# Patient Record
Sex: FEMALE | Race: OTHER
[De-identification: names, ages, dates, MRNs, and addresses within clinical notes are randomized per-mention and may not be internally consistent; named-entity substitution may affect disease eponyms.]

---

## 2018-06-11 ENCOUNTER — HOSPITAL ENCOUNTER (EMERGENCY)
Dept: HOSPITAL 56 - MW.ED | Age: 43
Discharge: HOME | End: 2018-06-11
Payer: SELF-PAY

## 2018-06-11 DIAGNOSIS — Z79.899: ICD-10-CM

## 2018-06-11 DIAGNOSIS — I10: ICD-10-CM

## 2018-06-11 DIAGNOSIS — N83.202: Primary | ICD-10-CM

## 2018-06-11 LAB
CHLORIDE SERPL-SCNC: 105 MMOL/L (ref 98–107)
SODIUM SERPL-SCNC: 142 MMOL/L (ref 136–145)

## 2018-06-11 PROCEDURE — 96361 HYDRATE IV INFUSION ADD-ON: CPT

## 2018-06-11 PROCEDURE — 96375 TX/PRO/DX INJ NEW DRUG ADDON: CPT

## 2018-06-11 PROCEDURE — 96374 THER/PROPH/DIAG INJ IV PUSH: CPT

## 2018-06-11 PROCEDURE — 85025 COMPLETE CBC W/AUTO DIFF WBC: CPT

## 2018-06-11 PROCEDURE — 83690 ASSAY OF LIPASE: CPT

## 2018-06-11 PROCEDURE — 99284 EMERGENCY DEPT VISIT MOD MDM: CPT

## 2018-06-11 PROCEDURE — 87086 URINE CULTURE/COLONY COUNT: CPT

## 2018-06-11 PROCEDURE — 36415 COLL VENOUS BLD VENIPUNCTURE: CPT

## 2018-06-11 PROCEDURE — 74178 CT ABD&PLV WO CNTR FLWD CNTR: CPT

## 2018-06-11 PROCEDURE — 87040 BLOOD CULTURE FOR BACTERIA: CPT

## 2018-06-11 PROCEDURE — 81001 URINALYSIS AUTO W/SCOPE: CPT

## 2018-06-11 PROCEDURE — 80053 COMPREHEN METABOLIC PANEL: CPT

## 2018-06-11 PROCEDURE — 81025 URINE PREGNANCY TEST: CPT

## 2018-06-11 NOTE — EDM.PDOC
ED HPI GENERAL MEDICAL PROBLEM





- General


Chief Complaint: Flank Pain


Stated Complaint: LEFT SIDE ABDOMINAL PAIN


Time Seen by Provider: 06/11/18 19:18


Source of Information: Reports: Patient





- History of Present Illness


INITIAL COMMENTS - FREE TEXT/NARRATIVE: 





HISTORY AND PHYSICAL:


History of present illness:


[Patient presents with left-sided flank pain for 2 days increasing in severity 

she rates pain 5 out of 10 nonradiating no fever nausea vomiting chills sweats 

she does relate that she does have a history of renal stones





No chest pain shortness breath headache dizziness palpitation no bowel or urine 

symptoms]


Review of systems: 


As per history of present illness and below otherwise all systems reviewed and 

negative.


Past medical history: 


As per history of present illness and as reviewed below otherwise 

noncontributory.


Surgical history: 


As per history of present illness and as reviewed below otherwise 

noncontributory.


Social history: 


No reported history of drug or alcohol abuse.


Family history: 


As per history of present illness and as reviewed below otherwise 

noncontributory.


Physical exam:


HEENT: Atraumatic, normocephalic, pupils reactive, negative for conjunctival 

pallor or scleral icterus, mucous membranes moist, throat clear, neck supple, 

nontender, trachea midline.


Lungs: Clear to auscultation, breath sounds equal bilaterally, chest nontender.


Heart: S1S2, regular, negative for clicks, rubs, or JVD.


Abdomen: Soft, nondistended, mild nonfocal tenderness on the left upper and 

lower quadrant. Negative for masses or hepatosplenomegaly. Negative for 

costovertebral tenderness.


Pelvis: Stable nontender.


Genitourinary: Deferred.


Rectal: Deferred.


Extremities: Atraumatic, negative for cords or calf pain. Neurovascular 

unremarkable.


Neuro: Awake, alert, oriented. Cranial nerves II through XII unremarkable. 

Cerebellum unremarkable. Motor and sensory unremarkable throughout. Exam 

nonfocal.


Diagnostics:


[CBC CMP UA lipase


CT abdomen pelvis with and without contrast


]


Therapeutics:


[Liter normal saline bolus


Toradol 30 mg IV


]


Impression: 


ovarian cyst 3.5 cm on the left


definitive disposition and diagnosis as appropriate pending reevaluation and 

review of above.


  ** Left Flank


Pain Score (Numeric/FACES): 6





- Related Data


 Allergies











Allergy/AdvReac Type Severity Reaction Status Date / Time


 


No Known Allergies Allergy   Verified 06/11/18 18:46











Home Meds: 


 Home Meds





ClonazePAM [KlonoPIN] 0.5 mg PO BID PRN 05/20/16 [History]


Lisinopril 25 mg PO DAILY 05/20/16 [History]











Past Medical History


Cardiovascular History: Reports: Hypertension


Genitourinary History: Reports: Renal Calculus, UTI, Recurrent


Other OB/BYN History: tubal


Psychiatric History: Reports: Anxiety, Depression





Social & Family History





- Family History


Family Medical History: Noncontributory





- Tobacco Use


Smoking Status *Q: Never Smoker





- Recreational Drug Use


Recreational Drug Use: No





ED ROS GENERAL





- Review of Systems


Review Of Systems: See Below





ED EXAM, GENERAL





- Physical Exam


Exam: See Below





Course





- Vital Signs


Last Recorded V/S: 


 Last Vital Signs











Temp  97.5 F   06/11/18 18:44


 


Pulse  94   06/11/18 18:44


 


Resp  18   06/11/18 18:44


 


BP  113/69   06/11/18 18:44


 


Pulse Ox  95   06/11/18 18:44














- Orders/Labs/Meds


Orders: 


 Active Orders 24 hr











 Category Date Time Status


 


 Abdomen Pelvis w wo Cont [CT] Stat Exams  06/11/18 19:19 Taken


 


 CULTURE BLOOD [BC] Stat Lab  06/11/18 19:30 Received


 


 CULTURE BLOOD [BC] Stat Lab  06/11/18 19:49 Received


 


 CULTURE URINE [RM] Stat Lab  06/11/18 19:21 Ordered


 


 HCG QUALITATIVE,URINE [URCHEM] Stat Lab  06/11/18 19:21 Ordered


 


 UA W/MICROSCOPIC [URIN] Stat Lab  06/11/18 19:21 Ordered


 


 Blood Culture x2 Reflex Set [OM.PC] Stat Oth  06/11/18 19:18 Ordered











Labs: 


 Laboratory Tests











  06/11/18 06/11/18 06/11/18 Range/Units





  19:21 19:21 19:42 


 


WBC    10.01  (4.0-11.0)  K/uL


 


RBC    4.54  (4.30-5.90)  M/uL


 


Hgb    14.0  (12.0-16.0)  g/dL


 


Hct    41.2  (36.0-46.0)  %


 


MCV    90.7  (80.0-98.0)  fL


 


MCH    30.8  (27.0-32.0)  pg


 


MCHC    34.0  (31.0-37.0)  g/dL


 


RDW Std Deviation    43.6  (28.0-62.0)  fl


 


RDW Coeff of Brissa    13  (11.0-15.0)  %


 


Plt Count    409 H  (150-400)  K/uL


 


MPV    9.70  (7.40-12.00)  fL


 


Neut % (Auto)    70.1  (48.0-80.0)  %


 


Lymph % (Auto)    16.5  (16.0-40.0)  %


 


Mono % (Auto)    12.6  (0.0-15.0)  %


 


Eos % (Auto)    0.6  (0.0-7.0)  %


 


Baso % (Auto)    0.2  (0.0-1.5)  %


 


Neut # (Auto)    7.0 H  (1.4-5.7)  K/uL


 


Lymph # (Auto)    1.7  (0.6-2.4)  K/uL


 


Mono # (Auto)    1.3 H  (0.0-0.8)  K/uL


 


Eos # (Auto)    0.1  (0.0-0.7)  K/uL


 


Baso # (Auto)    0.0  (0.0-0.1)  K/uL


 


Nucleated RBC %    0.0  /100WBC


 


Nucleated RBCs #    0  K/uL


 


Sodium     (136-145)  mmol/L


 


Potassium     (3.5-5.1)  mmol/L


 


Chloride     ()  mmol/L


 


Carbon Dioxide     (21.0-32.0)  mmol/L


 


BUN     (7.0-18.0)  mg/dL


 


Creatinine     (0.6-1.0)  mg/dL


 


Est Cr Clr Drug Dosing     mL/min


 


Estimated GFR (MDRD)     ml/min


 


Glucose     ()  mg/dL


 


Calcium     (8.5-10.1)  mg/dL


 


Total Bilirubin     (0.2-1.0)  mg/dL


 


AST     (15-37)  IU/L


 


ALT     (14-63)  IU/L


 


Alkaline Phosphatase     ()  U/L


 


Total Protein     (6.4-8.2)  g/dL


 


Albumin     (3.4-5.0)  g/dL


 


Globulin     (2.0-3.5)  g/dL


 


Albumin/Globulin Ratio     (1.3-2.8)  


 


Lipase     ()  U/L


 


Urine Color  YELLOW    


 


Urine Appearance  CLEAR    


 


Urine pH  6.5    (5.0-8.0)  


 


Ur Specific Gravity  1.025    (1.001-1.035)  


 


Urine Protein  NEGATIVE    (NEGATIVE)  mg/dL


 


Urine Glucose (UA)  NEGATIVE    (NEGATIVE)  mg/dL


 


Urine Ketones  NEGATIVE    (NEGATIVE)  mg/dL


 


Urine Occult Blood  NEGATIVE    (NEGATIVE)  


 


Urine Nitrite  NEGATIVE    (NEGATIVE)  


 


Urine Bilirubin  NEGATIVE    (NEGATIVE)  


 


Urine Urobilinogen  0.2    (<2.0)  EU/dL


 


Ur Leukocyte Esterase  NEGATIVE    (NEGATIVE)  


 


Urine RBC  0-1    (0-2/HPF)  


 


Urine WBC  0-1    (0-5/HPF)  


 


Ur Epithelial Cells  MODERATE    (NONE-FEW)  


 


Urine Bacteria  RARE    (NEGATIVE)  


 


Urine HCG, Qual   NEGATIVE   (NEGATIVE)  














  06/11/18 06/11/18 Range/Units





  19:42 19:42 


 


WBC    (4.0-11.0)  K/uL


 


RBC    (4.30-5.90)  M/uL


 


Hgb    (12.0-16.0)  g/dL


 


Hct    (36.0-46.0)  %


 


MCV    (80.0-98.0)  fL


 


MCH    (27.0-32.0)  pg


 


MCHC    (31.0-37.0)  g/dL


 


RDW Std Deviation    (28.0-62.0)  fl


 


RDW Coeff of Brissa    (11.0-15.0)  %


 


Plt Count    (150-400)  K/uL


 


MPV    (7.40-12.00)  fL


 


Neut % (Auto)    (48.0-80.0)  %


 


Lymph % (Auto)    (16.0-40.0)  %


 


Mono % (Auto)    (0.0-15.0)  %


 


Eos % (Auto)    (0.0-7.0)  %


 


Baso % (Auto)    (0.0-1.5)  %


 


Neut # (Auto)    (1.4-5.7)  K/uL


 


Lymph # (Auto)    (0.6-2.4)  K/uL


 


Mono # (Auto)    (0.0-0.8)  K/uL


 


Eos # (Auto)    (0.0-0.7)  K/uL


 


Baso # (Auto)    (0.0-0.1)  K/uL


 


Nucleated RBC %    /100WBC


 


Nucleated RBCs #    K/uL


 


Sodium  142   (136-145)  mmol/L


 


Potassium  3.2 L   (3.5-5.1)  mmol/L


 


Chloride  105   ()  mmol/L


 


Carbon Dioxide  24.0   (21.0-32.0)  mmol/L


 


BUN  22 H   (7.0-18.0)  mg/dL


 


Creatinine  0.8   (0.6-1.0)  mg/dL


 


Est Cr Clr Drug Dosing  67.72   mL/min


 


Estimated GFR (MDRD)  > 60.0   ml/min


 


Glucose  183 H   ()  mg/dL


 


Calcium  9.1   (8.5-10.1)  mg/dL


 


Total Bilirubin  0.2   (0.2-1.0)  mg/dL


 


AST  9 L   (15-37)  IU/L


 


ALT  26   (14-63)  IU/L


 


Alkaline Phosphatase  75   ()  U/L


 


Total Protein  6.8   (6.4-8.2)  g/dL


 


Albumin  3.7   (3.4-5.0)  g/dL


 


Globulin  3.1   (2.0-3.5)  g/dL


 


Albumin/Globulin Ratio  1.2 L   (1.3-2.8)  


 


Lipase   128  ()  U/L


 


Urine Color    


 


Urine Appearance    


 


Urine pH    (5.0-8.0)  


 


Ur Specific Gravity    (1.001-1.035)  


 


Urine Protein    (NEGATIVE)  mg/dL


 


Urine Glucose (UA)    (NEGATIVE)  mg/dL


 


Urine Ketones    (NEGATIVE)  mg/dL


 


Urine Occult Blood    (NEGATIVE)  


 


Urine Nitrite    (NEGATIVE)  


 


Urine Bilirubin    (NEGATIVE)  


 


Urine Urobilinogen    (<2.0)  EU/dL


 


Ur Leukocyte Esterase    (NEGATIVE)  


 


Urine RBC    (0-2/HPF)  


 


Urine WBC    (0-5/HPF)  


 


Ur Epithelial Cells    (NONE-FEW)  


 


Urine Bacteria    (NEGATIVE)  


 


Urine HCG, Qual    (NEGATIVE)  











Meds: 


Medications














Discontinued Medications














Generic Name Dose Route Start Last Admin





  Trade Name Freq  PRN Reason Stop Dose Admin


 


Sodium Chloride  1,000 mls @ 999 mls/hr  06/11/18 19:19  06/11/18 19:35





  Normal Saline  IV  06/11/18 20:19  999 mls/hr





  STAT ONE   Administration





     





     





     





     


 


Iopamidol  200 ml  06/11/18 20:20  06/11/18 20:22





  Isovue Multipack-370 (76%)  IVPUSH  06/11/18 20:21  100 ml





  ONETIME STA   Administration





     





     





     





     


 


Ketorolac Tromethamine  30 mg  06/11/18 19:19  06/11/18 19:36





  Toradol  IVPUSH  06/11/18 19:20  30 mg





  ONETIME ONE   Administration





     





     





     





     


 


Ondansetron HCl  8 mg  06/11/18 19:19  06/11/18 19:36





  Zofran  IVPUSH  06/11/18 19:20  8 mg





  ONETIME ONE   Administration





     





     





     





     














Departure





- Departure


Time of Disposition: 21:10


Disposition: Home, Self-Care 01


Condition: Good


Clinical Impression: 


 Ovarian cyst








- Discharge Information


Referrals: 


PCP,None [Primary Care Provider] - 


Forms:  ED Department Discharge


Additional Instructions: 


Medication as prescribed


Return if symptoms persist or worsen


Follow-up with gynecology as needed





[facility] [address] [address]


Phone: [phone]


Fax: [phone]








The following information is given to patients seen in the emergency department 

who are being discharged to home. This information is to outline your options 

for follow-up care. We provide all patients seen in our emergency department 

with a follow-up referral.





The need for follow-up, as well as the timing and circumstances, are variable 

depending upon the specifics of your emergency department visit.





If you don't have a primary care physician on staff, we will provide you with a 

referral. We always advise you to contact your personal physician following an 

emergency department visit to inform them of the circumstance of the visit and 

for follow-up with them and/or the need for any referrals to a consulting 

specialist.





The emergency department will also refer you to a specialist when appropriate. 

This referral assures that you have the opportunity for follow-up care with a 

specialist. All of these measure are taken in an effort to provide you with 

optimal care, which includes your follow-up.





Under all circumstances we always encourage you to contact your private 

physician who remains a resource for coordinating your care. When calling for 

follow-up care, please make the office aware that this follow-up is from your 

recent emergency room visit. If for any reason you are refused follow-up, 

please contact the [facility] emergency department at [phone] 

and asked to speak to the emergency department charge nurse.











- My Orders


Last 24 Hours: 


My Active Orders





06/11/18 19:18


Blood Culture x2 Reflex Set [OM.PC] Stat 





06/11/18 19:19


Abdomen Pelvis w wo Cont [CT] Stat 





06/11/18 19:21


CULTURE URINE [RM] Stat 


HCG QUALITATIVE,URINE [URCHEM] Stat 


UA W/MICROSCOPIC [URIN] Stat 





06/11/18 19:30


CULTURE BLOOD [BC] Stat 





06/11/18 19:49


CULTURE BLOOD [BC] Stat 














- Assessment/Plan


Last 24 Hours: 


My Active Orders





06/11/18 19:18


Blood Culture x2 Reflex Set [OM.PC] Stat 





06/11/18 19:19


Abdomen Pelvis w wo Cont [CT] Stat 





06/11/18 19:21


CULTURE URINE [RM] Stat 


HCG QUALITATIVE,URINE [URCHEM] Stat 


UA W/MICROSCOPIC [URIN] Stat 





06/11/18 19:30


CULTURE BLOOD [BC] Stat 





06/11/18 19:49


CULTURE BLOOD [BC] Stat

## 2018-06-12 VITALS — DIASTOLIC BLOOD PRESSURE: 59 MMHG | SYSTOLIC BLOOD PRESSURE: 112 MMHG

## 2018-06-12 NOTE — CT
EXAM DATE: 18



PATIENT'S AGE: 42





Patient: KALE WETZEL



Facility: [place], [place]

Patient ID: [id number]

Site Patient ID: [id number].

Site Accession #: [id number].

: 1975

Study: CT Abdomen/Pelvis W/ and W/O Cont [id number]/11/2018 8:23:31 PM

Ordering Physician: Doctor Temp



Final Report: 

INDICATION:

Lower abdominal pain. Cholecystectomy. 



TECHNIQUE:

CT abdomen and pelvis acquired without and with  mL Isovue 370. Coronal 
and sagittal reformats were obtained. 



COMPARISON:

None



FINDINGS:

 CT images: Nonobstructive bowel gas pattern. Cholecystectomy surgical 
clips are noted in the right upper abdominal quadrant. 

Lower chest: Unremarkable. 

Liver: Unremarkable. 

Spleen: Unremarkable. 

Pancreas: Unremarkable. 

Gallbladder and bile ducts: Gallbladder is surgically absent. 

Kidneys: Unremarkable. No kidney or ureteral stones and no hydronephrosis seen. 
Mild asymmetric atrophy of left kidney with scarring of the anterior left renal 
cortex on series 301, image 53. No imaging evidence of focal pyelonephritis. 

Adrenal glands: Unremarkable. 

GI tract: Unremarkable. The appendix is normal in appearance and size. 

Vascular: Unremarkable. 

Lymph nodes: Unremarkable. 

Miscellaneous: Unremarkable. No pneumoperitoneum is seen. No significant 
ascites is noted.

Pelvic Organs: Left ovarian cyst measures 3.5 centimeters on series 301, image 
115. No adjacent fat stranding or fluid. 

Bones: Unremarkable for age. 



IMPRESSION:

1. 3.5 centimeter left ovarian cyst, which can be a normal finding in a 
premenopausal female. If any clinical concern for ovarian torsion, consider 
pelvic ultrasound. 

2. No obstructing renal or ureteral calculi. 

3. Normal appendix.



Dictated by Moustapha Kaufman MD @ 2018 8:48:05 PM



Please note that all CT scans at this facility use dose modulation, iterative 
reconstruction, and/or weight-based dosing when appropriate to reduce radiation 
dose to as low as reasonably achievable.



Dictated by: Moustapha Kaufman MD @ 2018 20:48:10

(Electronic Signature)







Report Signed by Proxy.
[facility]